# Patient Record
Sex: MALE | Race: WHITE | NOT HISPANIC OR LATINO | URBAN - METROPOLITAN AREA
[De-identification: names, ages, dates, MRNs, and addresses within clinical notes are randomized per-mention and may not be internally consistent; named-entity substitution may affect disease eponyms.]

---

## 2023-12-14 ENCOUNTER — LAB OUTSIDE AN ENCOUNTER (OUTPATIENT)
Dept: URBAN - METROPOLITAN AREA CLINIC 70 | Facility: CLINIC | Age: 53
End: 2023-12-14

## 2023-12-14 ENCOUNTER — DASHBOARD ENCOUNTERS (OUTPATIENT)
Age: 53
End: 2023-12-14

## 2023-12-14 ENCOUNTER — OFFICE VISIT (OUTPATIENT)
Dept: URBAN - METROPOLITAN AREA CLINIC 70 | Facility: CLINIC | Age: 53
End: 2023-12-14
Payer: MEDICARE

## 2023-12-14 VITALS
WEIGHT: 302.6 LBS | SYSTOLIC BLOOD PRESSURE: 129 MMHG | HEIGHT: 76 IN | TEMPERATURE: 97.9 F | HEART RATE: 87 BPM | BODY MASS INDEX: 36.85 KG/M2 | DIASTOLIC BLOOD PRESSURE: 85 MMHG

## 2023-12-14 DIAGNOSIS — Z80.0 FAMILY HX OF COLON CANCER: ICD-10-CM

## 2023-12-14 DIAGNOSIS — Z86.010 HX OF ADENOMATOUS COLONIC POLYPS: ICD-10-CM

## 2023-12-14 PROBLEM — 429047008: Status: ACTIVE | Noted: 2023-12-14

## 2023-12-14 PROCEDURE — 99203 OFFICE O/P NEW LOW 30 MIN: CPT | Performed by: INTERNAL MEDICINE

## 2023-12-14 RX ORDER — GABAPENTIN 400 MG/1
CAPSULE ORAL
Qty: 150 CAPSULE | Status: ACTIVE | COMMUNITY

## 2023-12-14 RX ORDER — LEVETIRACETAM 500 MG/1
TABLET, FILM COATED ORAL
Qty: 450 TABLET | Status: ACTIVE | COMMUNITY

## 2023-12-14 RX ORDER — NORTRIPTYLINE HYDROCHLORIDE 50 MG/1
TAKE 2 CAPSULES BY MOUTH AT BEDTIME CAPSULE ORAL
Qty: 180 EACH | Refills: 0 | Status: ACTIVE | COMMUNITY

## 2023-12-14 RX ORDER — METFORMIN HYDROCHLORIDE 500 MG/1
TAKE 1 TABLET (500 MG) BY ORAL ROUTE 2 TIMES PER DAY WITH MORNING AND EVENING MEALS TABLET, COATED ORAL 2
Qty: 0 | Refills: 0 | Status: DISCONTINUED | COMMUNITY
Start: 1900-01-01

## 2023-12-14 RX ORDER — TESTOSTERONE CYPIONATE 200 MG/ML
INJECTION INTRAMUSCULAR
Qty: 3 MILLILITER | Status: ACTIVE | COMMUNITY

## 2023-12-14 RX ORDER — CELECOXIB 200 MG/1
CAPSULE ORAL
Qty: 30 CAPSULE | Status: ACTIVE | COMMUNITY

## 2023-12-14 RX ORDER — SUVOREXANT 20 MG/1
TABLET, FILM COATED ORAL
Qty: 0 | Refills: 0 | Status: DISCONTINUED | COMMUNITY
Start: 1900-01-01

## 2023-12-14 NOTE — PHYSICAL EXAM GASTROINTESTINAL
Abdomen , soft, nontender, nondistended , mildly obese,no guarding or rigidity , no masses palpable , normal bowel sounds , Liver and Spleen, no hepatosplenomegaly , liver nontender

## 2023-12-21 ENCOUNTER — TELEPHONE ENCOUNTER (OUTPATIENT)
Dept: URBAN - METROPOLITAN AREA CLINIC 70 | Facility: CLINIC | Age: 53
End: 2023-12-21

## 2023-12-22 ENCOUNTER — OFFICE VISIT (OUTPATIENT)
Dept: URBAN - METROPOLITAN AREA SURGERY CENTER 24 | Facility: SURGERY CENTER | Age: 53
End: 2023-12-22